# Patient Record
Sex: FEMALE | Race: AMERICAN INDIAN OR ALASKA NATIVE
[De-identification: names, ages, dates, MRNs, and addresses within clinical notes are randomized per-mention and may not be internally consistent; named-entity substitution may affect disease eponyms.]

---

## 2019-11-28 ENCOUNTER — HOSPITAL ENCOUNTER (EMERGENCY)
Dept: HOSPITAL 5 - ED | Age: 34
Discharge: HOME | End: 2019-11-28
Payer: SELF-PAY

## 2019-11-28 VITALS — SYSTOLIC BLOOD PRESSURE: 139 MMHG | DIASTOLIC BLOOD PRESSURE: 90 MMHG

## 2019-11-28 DIAGNOSIS — J40: Primary | ICD-10-CM

## 2019-11-28 DIAGNOSIS — F12.10: ICD-10-CM

## 2019-11-28 PROCEDURE — 71046 X-RAY EXAM CHEST 2 VIEWS: CPT

## 2019-11-28 PROCEDURE — 81025 URINE PREGNANCY TEST: CPT

## 2019-11-28 NOTE — EVENT NOTE
ED Screening Note


Date of service: 11/28/19


Time: 14:06


ED Screening Note: 








Patient cold symptoms with chest and congestion , cough UP brown pleghm, 

runnynose. Positive sob. No CP. No fever, chills/ NV.


Bronchitis in the past





Lungs:- NL WOB, Cough, occasional rhonchi on rt cleared withing





?Bronchtis/URI/LRTI/sinusitis





This initial assessment/diagnostic orders/clinical plan/treatment(s) is/are 

subject to change based on patients health status, clinical progression and re-

assessment by fellow clinical providers in the ED. Further treatment and workup 

at subsequent clinical providers discretion. Patient/guardian urged not to elope

from the ED as their condition may be serious if not clinically assessed and 

managed. 





Initial orders include: CXR

## 2019-11-28 NOTE — EMERGENCY DEPARTMENT REPORT
- General


Chief Complaint: Upper Respiratory Infection


Stated Complaint: COLD SX/CHEST PAIN


Time Seen by Provider: 11/28/19 14:05


Source: patient


Mode of arrival: Ambulatory


Limitations: No Limitations





- History of Present Illness


Initial Comments: 





34-year-old female presents to ED with cough 3 days.  Patient reports chest 

pain, cough productive of brownish sputum.  Reports that she is a smoker, hasn't

diagnosed with bronchitis in the past.  She reports associated mild shortness of

breath, runny nose, body aches, and chills.


MD Complaint: cough


-: days(s) (3)


Severity: moderate


Consistency: constant


Improves With: nothing


Worsens With: nothing


Associated Symptoms: chills, myalgias, rhinorrhea, nasal congestion, cough, 

chest pain, shortness of breath.  denies: nausea, vomiting





- Related Data


                                  Previous Rx's











 Medication  Instructions  Recorded  Last Taken  Type


 


Albuterol Sulfate [Proventil Hfa] 2 puff IH Q4HR PRN #1 hfa.aer.ad 11/28/19 

Unknown Rx


 


Azithromycin 500 mg PO QDAY #3 tablet 11/28/19 Unknown Rx


 


Benzonatate [Tessalon Perles] 100 mg PO Q8HR PRN #20 capsule 11/28/19 Unknown Rx


 


Naproxen [Naprosyn] 500 mg PO BID #20 tablet 11/28/19 Unknown Rx


 


predniSONE [Deltasone] 50 mg PO QDAY #5 tab 11/28/19 Unknown Rx











                                    Allergies











Allergy/AdvReac Type Severity Reaction Status Date / Time


 


No Known Allergies Allergy   Unverified 02/20/15 17:05














ED Review of Systems


ROS: 


Stated complaint: COLD SX/CHEST PAIN


Other details as noted in HPI





Comment: All other systems reviewed and negative


Constitutional: chills


Respiratory: cough, shortness of breath


Cardiovascular: chest pain


Gastrointestinal: denies: nausea, vomiting


Musculoskeletal: myalgia





ED Past Medical Hx





- Past Medical History


Previous Medical History?: No





- Surgical History


Past Surgical History?: No





- Social History


Smoking Status: Never Smoker


Substance Use Type: Marijuana





- Medications


Home Medications: 


                                Home Medications











 Medication  Instructions  Recorded  Confirmed  Last Taken  Type


 


Albuterol Sulfate [Proventil Hfa] 2 puff IH Q4HR PRN #1 hfa.aer.ad 11/28/19  

Unknown Rx


 


Azithromycin 500 mg PO QDAY #3 tablet 11/28/19  Unknown Rx


 


Benzonatate [Tessalon Perles] 100 mg PO Q8HR PRN #20 capsule 11/28/19  Unknown 

Rx


 


Naproxen [Naprosyn] 500 mg PO BID #20 tablet 11/28/19  Unknown Rx


 


predniSONE [Deltasone] 50 mg PO QDAY #5 tab 11/28/19  Unknown Rx














ED Physical Exam





- General


Limitations: No Limitations


General appearance: alert, in no apparent distress





- Head


Head exam: Present: atraumatic, normocephalic





- Eye


Eye exam: Present: normal appearance, EOMI





- ENT


ENT exam: Present: mucous membranes moist





- Neck


Neck exam: Present: normal inspection





- Respiratory


Respiratory exam: Present: normal lung sounds bilaterally.  Absent: respiratory 

distress





- Cardiovascular


Cardiovascular Exam: Present: regular rate, normal rhythm





- GI/Abdominal


GI/Abdominal exam: Absent: distended





- Extremities Exam


Extremities exam: Present: normal inspection





- Neurological Exam


Neurological exam: Present: alert, oriented X3





- Psychiatric


Psychiatric exam: Present: normal affect, normal mood





- Skin


Skin exam: Present: warm, dry, intact, normal color





ED Course


                                   Vital Signs











  11/28/19 11/28/19





  14:02 17:44


 


Temperature 98.8 F 98.9 F


 


Pulse Rate 78 79


 


Respiratory 20 16





Rate  


 


Blood Pressure 165/81 


 


Blood Pressure  139/90





[Right]  


 


O2 Sat by Pulse 100 99





Oximetry  














ED Medical Decision Making





- Radiology Data


Radiology results: report reviewed, image reviewed





- Medical Decision Making





34-year-old female presents to ED with URI symptoms.  Patient reported cough 

productive of brownish sputum.  Chest x-ray is unremarkable.  Patient is a 

smoker, reports history of bronchitis in the past.  This is likely another 

episode of bronchitis.  Patient is in no respiratory distress.  Vital signs are 

normal.  Prescriptions given.  Outpatient follow-up advised.  Return precautions

 given.





- Differential Diagnosis


URI, bronchitis, pneumonia


Critical care attestation.: 


If time is entered above; I have spent that time in minutes in the direct care 

of this critically ill patient, excluding procedure time.








ED Disposition


Clinical Impression: 


 Acute bronchitis





Disposition: DC-01 TO HOME OR SELFCARE


Is pt being admited?: No


Condition: Stable


Instructions:  Acute Bronchitis (ED)


Prescriptions: 


Azithromycin 500 mg PO QDAY #3 tablet


predniSONE [Deltasone] 50 mg PO QDAY #5 tab


Naproxen [Naprosyn] 500 mg PO BID #20 tablet


Albuterol Sulfate [Proventil Hfa] 2 puff IH Q4HR PRN #1 hfa.aer.ad


 PRN Reason: Wheezing


Benzonatate [Tessalon Perles] 100 mg PO Q8HR PRN #20 capsule


 PRN Reason: Cough


Referrals: 


PRIMARY CARE,MD [Primary Care Provider] - 3-5 Days


[facility] [Provider Group] - 3-5 Days


Forms:  Work/School Release Form(ED)


Time of Disposition: 17:13

## 2019-11-28 NOTE — XRAY REPORT
CHEST 2 VIEWS 



INDICATION / CLINICAL INFORMATION:

cough, sob, brown pleghm.



COMPARISON: 

None available.



FINDINGS:



SUPPORT DEVICES: None.



HEART / MEDIASTINUM: No significant abnormality. 



LUNGS / PLEURA: No significant pulmonary or pleural abnormality. No pneumothorax. 



ADDITIONAL FINDINGS: No significant additional findings.



IMPRESSION:

1. No acute findings.



Signer Name: Ann Iverson MD 

Signed: 11/28/2019 5:09 PM

 Workstation Name: [id number]

## 2020-01-26 ENCOUNTER — HOSPITAL ENCOUNTER (EMERGENCY)
Dept: HOSPITAL 5 - ED | Age: 35
LOS: 1 days | Discharge: HOME | End: 2020-01-27
Payer: MEDICAID

## 2020-01-26 DIAGNOSIS — E87.6: Primary | ICD-10-CM

## 2020-01-26 DIAGNOSIS — R00.2: ICD-10-CM

## 2020-01-26 DIAGNOSIS — Z79.899: ICD-10-CM

## 2020-01-26 DIAGNOSIS — F41.9: ICD-10-CM

## 2020-01-26 DIAGNOSIS — F12.10: ICD-10-CM

## 2020-01-26 DIAGNOSIS — F17.200: ICD-10-CM

## 2020-01-26 LAB
BASOPHILS # (AUTO): 0 K/MM3 (ref 0–0.1)
BASOPHILS NFR BLD AUTO: 0.5 % (ref 0–1.8)
EOSINOPHIL # BLD AUTO: 0.1 K/MM3 (ref 0–0.4)
EOSINOPHIL NFR BLD AUTO: 2.5 % (ref 0–4.3)
HCT VFR BLD CALC: 36 % (ref 30.3–42.9)
HGB BLD-MCNC: 12.2 GM/DL (ref 10.1–14.3)
LYMPHOCYTES # BLD AUTO: 1.6 K/MM3 (ref 1.2–5.4)
LYMPHOCYTES NFR BLD AUTO: 38.6 % (ref 13.4–35)
MCHC RBC AUTO-ENTMCNC: 34 % (ref 30–34)
MCV RBC AUTO: 98 FL (ref 79–97)
MONOCYTES # (AUTO): 0.4 K/MM3 (ref 0–0.8)
MONOCYTES % (AUTO): 9.8 % (ref 0–7.3)
PLATELET # BLD: 176 K/MM3 (ref 140–440)
RBC # BLD AUTO: 3.68 M/MM3 (ref 3.65–5.03)

## 2020-01-26 PROCEDURE — 71275 CT ANGIOGRAPHY CHEST: CPT

## 2020-01-26 PROCEDURE — 85025 COMPLETE CBC W/AUTO DIFF WBC: CPT

## 2020-01-26 PROCEDURE — 93010 ELECTROCARDIOGRAM REPORT: CPT

## 2020-01-26 PROCEDURE — 84703 CHORIONIC GONADOTROPIN ASSAY: CPT

## 2020-01-26 PROCEDURE — 84439 ASSAY OF FREE THYROXINE: CPT

## 2020-01-26 PROCEDURE — 93005 ELECTROCARDIOGRAM TRACING: CPT

## 2020-01-26 PROCEDURE — 84484 ASSAY OF TROPONIN QUANT: CPT

## 2020-01-26 PROCEDURE — 80048 BASIC METABOLIC PNL TOTAL CA: CPT

## 2020-01-26 PROCEDURE — 36415 COLL VENOUS BLD VENIPUNCTURE: CPT

## 2020-01-26 PROCEDURE — 83735 ASSAY OF MAGNESIUM: CPT

## 2020-01-26 PROCEDURE — 85379 FIBRIN DEGRADATION QUANT: CPT

## 2020-01-26 PROCEDURE — 84443 ASSAY THYROID STIM HORMONE: CPT

## 2020-01-26 PROCEDURE — 80307 DRUG TEST PRSMV CHEM ANLYZR: CPT

## 2020-01-26 PROCEDURE — 99285 EMERGENCY DEPT VISIT HI MDM: CPT

## 2020-01-26 PROCEDURE — 71045 X-RAY EXAM CHEST 1 VIEW: CPT

## 2020-01-27 VITALS — SYSTOLIC BLOOD PRESSURE: 146 MMHG | DIASTOLIC BLOOD PRESSURE: 81 MMHG

## 2020-01-27 LAB
BENZODIAZEPINES SCREEN,URINE: (no result)
BUN SERPL-MCNC: 11 MG/DL (ref 7–17)
BUN/CREAT SERPL: 16 %
CALCIUM SERPL-MCNC: 9.3 MG/DL (ref 8.4–10.2)
HEMOLYSIS INDEX: 0
METHADONE SCREEN,URINE: (no result)
OPIATE SCREEN,URINE: (no result)
T4 FREE SERPL-MCNC: 1.17 NG/DL (ref 0.76–1.46)

## 2020-01-27 NOTE — EMERGENCY DEPARTMENT REPORT
ED Palpitations HPI





- General


Chief Complaint: Arrhythmia/Palpitations


Stated Complaint: RAPID HR


Time Seen by Provider: 01/27/20 03:13


Source: patient, EMS, old records reviewed (pt here 12/28/2019 for chest pain 

and left ankle swelling )


Mode of arrival: Ambulatory


Limitations: No Limitations





- History of Present Illness


Initial Comments: 





34-year-old female with no known past medical history presents complaining of 

intermittent palpitation and chest heaviness for last 2 days.  Yesterday patient

had an episode while exercising.  Her heart rate increased, chest pressure, and 

felt shaky.  Symptoms resolved with rest.  Today she had another episode while 

sitting relaxed.  She described it as a chest pressure that starts in the 

epigastrium and radiates to chest, feeling shaky, and palpitations.  She denies 

any skin shortness of breath, nausea, vomiting, or diaphoresis.  EMS reports 

that her heart rate ranged from 106-108 and went up to 140 for about 20 seconds 

then settled back down to 106.  Patient admits to marijuana use but denies other

drug ingestion.  She uses alcohol occasionally.  Patient is not currently on 

birth control pills, denies history of PE /dvt, clf tenderness recent travel.  

Patient has had left medial ankle swelling with mild intermittent pain for the 

last several weeks but denies calf tenderness or swelling.  Patient does endorse

some feeling of anxiety during episodes and in the past without a definitive 

diagnosis by a doctor.  PMD [facility]





- Related Data


                                  Previous Rx's











 Medication  Instructions  Recorded  Last Taken  Type


 


Albuterol Sulfate [Proventil Hfa] 2 puff IH Q4HR PRN #1 hfa.aer.ad 11/28/19 

Unknown Rx


 


Azithromycin 500 mg PO QDAY #3 tablet 11/28/19 Unknown Rx


 


Benzonatate [Tessalon Perles] 100 mg PO Q8HR PRN #20 capsule 11/28/19 Unknown Rx


 


Naproxen [Naprosyn] 500 mg PO BID #20 tablet 11/28/19 Unknown Rx


 


predniSONE [Deltasone] 50 mg PO QDAY #5 tab 11/28/19 Unknown Rx


 


Naproxen [Naprosyn] 500 mg PO BID #20 tablet 12/28/19 Unknown Rx


 


Ondansetron [Zofran Odt] 4 mg PO Q8HR PRN #20 tab.rapdis 12/28/19 Unknown Rx


 


traMADoL [Ultram] 50 mg PO Q6HR PRN #7 tablet 12/28/19 Unknown Rx


 


hydrOXYzine PAMOATE [Vistaril] 50 mg PO Q6HR PRN #20 capsule 01/27/20 Unknown Rx











                                    Allergies











Allergy/AdvReac Type Severity Reaction Status Date / Time


 


No Known Allergies Allergy   Unverified 02/20/15 17:05














ED Review of Systems


ROS: 


Stated complaint: RAPID HR


Other details as noted in HPI





Comment: All other systems reviewed and negative





ED Past Medical Hx





- Past Medical History


Previous Medical History?: No





- Surgical History


Past Surgical History?: No





- Social History


Smoking Status: Current Every Day Smoker


Substance Use Type: Marijuana





- Medications


Home Medications: 


                                Home Medications











 Medication  Instructions  Recorded  Confirmed  Last Taken  Type


 


Albuterol Sulfate [Proventil Hfa] 2 puff IH Q4HR PRN #1 hfa.aer.ad 11/28/19  

Unknown Rx


 


Azithromycin 500 mg PO QDAY #3 tablet 11/28/19  Unknown Rx


 


Benzonatate [Tessalon Perles] 100 mg PO Q8HR PRN #20 capsule 11/28/19  Unknown 

Rx


 


Naproxen [Naprosyn] 500 mg PO BID #20 tablet 11/28/19  Unknown Rx


 


predniSONE [Deltasone] 50 mg PO QDAY #5 tab 11/28/19  Unknown Rx


 


Naproxen [Naprosyn] 500 mg PO BID #20 tablet 12/28/19  Unknown Rx


 


Ondansetron [Zofran Odt] 4 mg PO Q8HR PRN #20 tab.rapdis 12/28/19  Unknown Rx


 


traMADoL [Ultram] 50 mg PO Q6HR PRN #7 tablet 12/28/19  Unknown Rx


 


hydrOXYzine PAMOATE [Vistaril] 50 mg PO Q6HR PRN #20 capsule 01/27/20  Unknown 

Rx














ED Physical Exam





- General


Limitations: No Limitations





- Other


Other exam information: 








General: No limitations, patient is alert in no acute distress


Head exam: Atraumatic, normocephalic


Eyes exam: Normal appearance


ENT: Moist mucous membrane


Neck exam: Normal inspection, full range of motion


Respiratory exam: Clear to auscultation bilateral, no wheezes, rales, crackles


Cardiovascular: Normal rate and rhythm


Abdomen: Soft, nondistended, and  nontender, with normal bowel sounds, no 

rebound, or guarding, 


Extremity: No deformity.  Full range of motion of extremities including left 

ankle.  Mild medial left ankle swelling without tenderness, warmth, or erythema.

  No calf tenderness or calf edema.


Back: Normal Inspection


Neurologic: Alert, oriented x3, speech clear, no gross motor or sensory deficit


Psychiatric: Normal mood, affect 


Skin: No rash





ED Course


                                   Vital Signs











  01/26/20 01/27/20 01/27/20





  23:31 03:47 04:00


 


Temperature 99.3 F  


 


Pulse Rate 85  88


 


Respiratory 18  23





Rate   


 


Blood Pressure 152/94  146/81


 


O2 Sat by Pulse 100 87 100





Oximetry   














  01/27/20 01/27/20





  04:16 04:30


 


Temperature  


 


Pulse Rate 76 


 


Respiratory 22 14





Rate  


 


Blood Pressure 146/81 146/81


 


O2 Sat by Pulse 100 





Oximetry  














ED Medical Decision Making





- Lab Data


Result diagrams: 


                                 01/26/20 23:40





                                 01/26/20 23:40








                                   Lab Results











  01/26/20 01/26/20 01/27/20 Range/Units





  23:40 23:40 02:30 


 


WBC  4.1 L    (4.5-11.0)  K/mm3


 


RBC  3.68    (3.65-5.03)  M/mm3


 


Hgb  12.2    (10.1-14.3)  gm/dl


 


Hct  36.0    (30.3-42.9)  %


 


MCV  98 H    (79-97)  fl


 


MCH  33 H    (28-32)  pg


 


MCHC  34    (30-34)  %


 


RDW  13.8    (13.2-15.2)  %


 


Plt Count  176    (140-440)  K/mm3


 


Lymph % (Auto)  38.6 H    (13.4-35.0)  %


 


Mono % (Auto)  9.8 H    (0.0-7.3)  %


 


Eos % (Auto)  2.5    (0.0-4.3)  %


 


Baso % (Auto)  0.5    (0.0-1.8)  %


 


Lymph #  1.6    (1.2-5.4)  K/mm3


 


Mono #  0.4    (0.0-0.8)  K/mm3


 


Eos #  0.1    (0.0-0.4)  K/mm3


 


Baso #  0.0    (0.0-0.1)  K/mm3


 


Seg Neutrophils %  48.6    (40.0-70.0)  %


 


Seg Neutrophils #  2.0    (1.8-7.7)  K/mm3


 


D-Dimer     (0-234)  ng/mlDDU


 


Sodium   140   (137-145)  mmol/L


 


Potassium   3.4 L   (3.6-5.0)  mmol/L


 


Chloride   102.5   ()  mmol/L


 


Carbon Dioxide   22   (22-30)  mmol/L


 


Anion Gap   19   mmol/L


 


BUN   11   (7-17)  mg/dL


 


Creatinine   0.7   (0.7-1.2)  mg/dL


 


Estimated GFR   > 60   ml/min


 


BUN/Creatinine Ratio   16   %


 


Glucose   138 H   ()  mg/dL


 


Calcium   9.3   (8.4-10.2)  mg/dL


 


Magnesium     (1.7-2.3)  mg/dL


 


Troponin T   < 0.010  < 0.010  (0.00-0.029)  ng/mL


 


TSH     (0.270-4.200)  mlU/mL


 


Free T4     (0.76-1.46)  ng/dL


 


HCG, Qual     (Negative)  


 


Urine Opiates Screen     


 


Urine Methadone Screen     


 


Ur Barbiturates Screen     


 


Ur Phencyclidine Scrn     


 


Ur Amphetamines Screen     


 


U Benzodiazepines Scrn     


 


Urine Cocaine Screen     


 


U Marijuana (THC) Screen     


 


Drugs of Abuse Note     














  01/27/20 01/27/20 01/27/20 Range/Units





  03:29 03:29 03:29 


 


WBC     (4.5-11.0)  K/mm3


 


RBC     (3.65-5.03)  M/mm3


 


Hgb     (10.1-14.3)  gm/dl


 


Hct     (30.3-42.9)  %


 


MCV     (79-97)  fl


 


MCH     (28-32)  pg


 


MCHC     (30-34)  %


 


RDW     (13.2-15.2)  %


 


Plt Count     (140-440)  K/mm3


 


Lymph % (Auto)     (13.4-35.0)  %


 


Mono % (Auto)     (0.0-7.3)  %


 


Eos % (Auto)     (0.0-4.3)  %


 


Baso % (Auto)     (0.0-1.8)  %


 


Lymph #     (1.2-5.4)  K/mm3


 


Mono #     (0.0-0.8)  K/mm3


 


Eos #     (0.0-0.4)  K/mm3


 


Baso #     (0.0-0.1)  K/mm3


 


Seg Neutrophils %     (40.0-70.0)  %


 


Seg Neutrophils #     (1.8-7.7)  K/mm3


 


D-Dimer  287.38 H    (0-234)  ng/mlDDU


 


Sodium     (137-145)  mmol/L


 


Potassium     (3.6-5.0)  mmol/L


 


Chloride     ()  mmol/L


 


Carbon Dioxide     (22-30)  mmol/L


 


Anion Gap     mmol/L


 


BUN     (7-17)  mg/dL


 


Creatinine     (0.7-1.2)  mg/dL


 


Estimated GFR     ml/min


 


BUN/Creatinine Ratio     %


 


Glucose     ()  mg/dL


 


Calcium     (8.4-10.2)  mg/dL


 


Magnesium     (1.7-2.3)  mg/dL


 


Troponin T     (0.00-0.029)  ng/mL


 


TSH    1.600  (0.270-4.200)  mlU/mL


 


Free T4    1.17  (0.76-1.46)  ng/dL


 


HCG, Qual   Negative   (Negative)  


 


Urine Opiates Screen     


 


Urine Methadone Screen     


 


Ur Barbiturates Screen     


 


Ur Phencyclidine Scrn     


 


Ur Amphetamines Screen     


 


U Benzodiazepines Scrn     


 


Urine Cocaine Screen     


 


U Marijuana (THC) Screen     


 


Drugs of Abuse Note     














  01/27/20 01/27/20 Range/Units





  03:29 04:02 


 


WBC    (4.5-11.0)  K/mm3


 


RBC    (3.65-5.03)  M/mm3


 


Hgb    (10.1-14.3)  gm/dl


 


Hct    (30.3-42.9)  %


 


MCV    (79-97)  fl


 


MCH    (28-32)  pg


 


MCHC    (30-34)  %


 


RDW    (13.2-15.2)  %


 


Plt Count    (140-440)  K/mm3


 


Lymph % (Auto)    (13.4-35.0)  %


 


Mono % (Auto)    (0.0-7.3)  %


 


Eos % (Auto)    (0.0-4.3)  %


 


Baso % (Auto)    (0.0-1.8)  %


 


Lymph #    (1.2-5.4)  K/mm3


 


Mono #    (0.0-0.8)  K/mm3


 


Eos #    (0.0-0.4)  K/mm3


 


Baso #    (0.0-0.1)  K/mm3


 


Seg Neutrophils %    (40.0-70.0)  %


 


Seg Neutrophils #    (1.8-7.7)  K/mm3


 


D-Dimer    (0-234)  ng/mlDDU


 


Sodium    (137-145)  mmol/L


 


Potassium    (3.6-5.0)  mmol/L


 


Chloride    ()  mmol/L


 


Carbon Dioxide    (22-30)  mmol/L


 


Anion Gap    mmol/L


 


BUN    (7-17)  mg/dL


 


Creatinine    (0.7-1.2)  mg/dL


 


Estimated GFR    ml/min


 


BUN/Creatinine Ratio    %


 


Glucose    ()  mg/dL


 


Calcium    (8.4-10.2)  mg/dL


 


Magnesium  2.00   (1.7-2.3)  mg/dL


 


Troponin T    (0.00-0.029)  ng/mL


 


TSH    (0.270-4.200)  mlU/mL


 


Free T4    (0.76-1.46)  ng/dL


 


HCG, Qual    (Negative)  


 


Urine Opiates Screen   Presumptive negative  


 


Urine Methadone Screen   Presumptive negative  


 


Ur Barbiturates Screen   Presumptive negative  


 


Ur Phencyclidine Scrn   Presumptive negative  


 


Ur Amphetamines Screen   Presumptive negative  


 


U Benzodiazepines Scrn   Presumptive negative  


 


Urine Cocaine Screen   Presumptive negative  


 


U Marijuana (THC) Screen   Presumptive positive  


 


Drugs of Abuse Note   Disclamer  














- EKG Data


-: EKG Interpreted by Me


EKG shows normal: sinus rhythm, ST-T waves (no STEMI)


Rate: tachycardia (101)





- EKG Data


When compared to previous EKG there are: previous EKG unavailable





- Radiology Data


Radiology results: report reviewed





CTA of the chest with 3D Reconstruction











Indication:


,cp, palpitations, elevated ddimer





Technique:


TECHNIQUE: Axial CT images were obtained through the chest after injection of 

100 cc of Omnipaque


350 IV contrast. 3 plane MIP reconstructions were produced. All CT scans at this

 location are


performed using CT dose reduction for ALARA by means of automated exposure 

control.





COMPARISON:


None











Automatic exposure control was utilized in an attempt to reduce radiation dose.





Findings:





Pulmonary arteries: The main pulmonary artery and right and left pulmonary 

artery branches fill


satisfactorily with contrast. No pulmonary embolus is seen.


Lungs: The lungs are clear.


Mediastinum: Heart size is normal. No adenopathy is seen.


Aorta: Normal in diameter. No dissection seen within limits of this exam.














Impression:


No pulmonary embolus is seen








- Medical Decision Making





mild tachy on arrival resolved


trop neg x 2 with low heart score


mild ddimer elevation with neg cta


mild hypokalemia tx with PO kcl


no recurrent palp during ed stay


uds + thc


ddx anxiety vs arrhythmia


outpt f/u with pmd and card for possible holter monitor will be advised.


pt requesting meds to help with her anxiety sx. vistaril will be prescribed (pt 

states she has been prescribed it in the past





- Differential Diagnosis


pe, anxiey, atypical cp, mi, pneumonthorax, arrhythmia, thyroid disease


Critical Care Time: No


Critical care attestation.: 


If time is entered above; I have spent that time in minutes in the direct care o

f this critically ill patient, excluding procedure time.








ED Disposition


Clinical Impression: 


 Palpitations, Hypokalemia, Anxiety





Disposition: DC-01 TO HOME OR SELFCARE


Is pt being admited?: No


Does the pt Need Aspirin: No


Condition: Stable


Instructions:  Palpitations (ED), Anxiety (ED)


Additional Instructions: 


  Follow-up with your doctor  or with the doctor/clinic provided.  Return if 

symptoms worsen as indicated by your discharge instructions.


Prescriptions: 


hydrOXYzine PAMOATE [Vistaril] 50 mg PO Q6HR PRN #20 capsule


 PRN Reason: Anxiety


Referrals: 


[facility] [Primary [facility] Provider[facility] - 3-5 Days


HOWARD GRAY MD [Staff Physician] - 2-3 Days (cardiolgy)


Time of Disposition: 05:30

## 2020-01-27 NOTE — CAT SCAN REPORT
CTA of the chest with 3D Reconstruction







Indication:

,cp, palpitations, elevated ddimer



Technique: 

TECHNIQUE: Axial CT images were obtained through the chest after injection of 100 cc of Omnipaque 350
 IV contrast. 3 plane MIP reconstructions were produced. All CT scans at this location are performed 
using CT dose reduction for ALARA by means of automated exposure control.



COMPARISON:

None







 Automatic exposure control was utilized in an attempt to reduce radiation dose.



Findings:



Pulmonary arteries: The main pulmonary artery and right and left pulmonary artery branches fill satis
factorily with contrast. No pulmonary embolus is seen.

Lungs: The lungs are clear.

Mediastinum: Heart size is normal.  No adenopathy is seen.

Aorta: Normal in diameter.  No dissection seen within limits of this exam.









Impression:

No pulmonary embolus is seen



Signer Name: Shashi Araya MD 

Signed: 1/27/2020 5:17 AM

 Workstation Name: [id number]

## 2020-01-27 NOTE — XRAY REPORT
CHEST 1 VIEW 



INDICATION / CLINICAL INFORMATION:

Chest Pain.



COMPARISON: 

12/28/2019



FINDINGS:



SUPPORT DEVICES: None.

HEART / MEDIASTINUM: No significant abnormality. 

LUNGS / PLEURA: No significant pulmonary or pleural abnormality..  No pneumothorax. 



ADDITIONAL FINDINGS: No significant additional findings.



IMPRESSION:

1. No acute findings.



Signer Name: Shashi Araya MD 

Signed: 1/26/2020 11:59 PM

 Workstation Name: [id number]

## 2020-01-30 ENCOUNTER — HOSPITAL ENCOUNTER (EMERGENCY)
Dept: HOSPITAL 5 - ED | Age: 35
Discharge: LEFT BEFORE DISCHARGE | End: 2020-01-30
Payer: MEDICAID

## 2020-01-30 VITALS — SYSTOLIC BLOOD PRESSURE: 140 MMHG | DIASTOLIC BLOOD PRESSURE: 95 MMHG

## 2020-01-30 DIAGNOSIS — F41.9: Primary | ICD-10-CM

## 2020-01-30 PROCEDURE — 93010 ELECTROCARDIOGRAM REPORT: CPT

## 2020-01-30 PROCEDURE — 93005 ELECTROCARDIOGRAM TRACING: CPT

## 2020-01-30 NOTE — EMERGENCY DEPARTMENT REPORT
Chief Complaint: Weakness


Stated Complaint: HEART MONITOR/DEHYDRATED/FLU





- HPI


History of Present Illness: 





patient presents with recurrent anxiety like symptoms








had extensive work up 4 days ago








followed by [facility]


has holter monitor








no hi no si





sober


gcs 15





s1 s2 rrr, mild tachycardia





heart rate 105-110 on my exam


likely anxiety driven given recent extensive negative work up


clear lungs


steady gait








gcs 15








extensive counseling done; self calming techniques


outpatient resources


complimentary therapy





neg trop x 3





cta chest neg





there is no facial droop.  Tongue is midline.  Extraocular movements are intact 

bilaterally.  Walking with a steady gait.  Speaking in full sentences.  Normal 

appropriate thought content.  5 out of 5 strength in 4 extremities.  Sensation 

is intact to light touch in 4 extremities.





2+ pulses noted in the bilateral upper and lower extremities.  There is no long 

bony tenderness.  The pelvis is stable.  The muscular compartments are soft.  

There is no palpable cord.  There is no redness, pus or streaking.





Tachycardia resolved on my examination.  States she is ready for discharge.  

Endorses ruddiness to follow up as an outpatient.  Not homicidal or suicidal.


                                   Vital Signs











  01/30/20





  13:00


 


Temperature 98 F


 


Pulse Rate 130 H


 


Respiratory 20





Rate 


 


Blood Pressure 140/95





[Right] 


 


O2 Sat by Pulse 98





Oximetry 























                                   Vital Signs











  01/30/20





  13:00


 


Temperature 98 F


 


Pulse Rate 130 H


 


Respiratory 20





Rate 


 


Blood Pressure 140/95





[Right] 


 


O2 Sat by Pulse 98





Oximetry 


























MSE screening note: 


Focused history and physical exam performed.


Due to findings the following was ordered:














ekgL: sinus 69 bpm, normal exias, qtc 383 ms, not a stemi, normal intervals, 

rate 69 bpm





ED Disposition for MSE


Clinical Impression: 


 Palpitations, Anxiety





Disposition: Z-07 MED SCREENING EXAM-LEFT


Is pt being admited?: No


Does the pt Need Aspirin: No


Condition: Stable

## 2020-02-02 ENCOUNTER — HOSPITAL ENCOUNTER (EMERGENCY)
Dept: HOSPITAL 5 - ED | Age: 35
LOS: 1 days | Discharge: HOME | End: 2020-02-03
Payer: MEDICAID

## 2020-02-02 VITALS — DIASTOLIC BLOOD PRESSURE: 74 MMHG | SYSTOLIC BLOOD PRESSURE: 118 MMHG

## 2020-02-02 DIAGNOSIS — Z79.899: ICD-10-CM

## 2020-02-02 DIAGNOSIS — F41.9: ICD-10-CM

## 2020-02-02 DIAGNOSIS — F17.200: ICD-10-CM

## 2020-02-02 DIAGNOSIS — K21.9: Primary | ICD-10-CM

## 2020-02-02 LAB
BASOPHILS # (AUTO): 0 K/MM3 (ref 0–0.1)
BASOPHILS NFR BLD AUTO: 0.6 % (ref 0–1.8)
BUN SERPL-MCNC: 9 MG/DL (ref 7–17)
BUN/CREAT SERPL: 13 %
CALCIUM SERPL-MCNC: 10.5 MG/DL (ref 8.4–10.2)
EOSINOPHIL # BLD AUTO: 0.1 K/MM3 (ref 0–0.4)
EOSINOPHIL NFR BLD AUTO: 1.4 % (ref 0–4.3)
HCT VFR BLD CALC: 41.7 % (ref 30.3–42.9)
HEMOLYSIS INDEX: 4
HGB BLD-MCNC: 14.2 GM/DL (ref 10.1–14.3)
LYMPHOCYTES # BLD AUTO: 1.4 K/MM3 (ref 1.2–5.4)
LYMPHOCYTES NFR BLD AUTO: 30.3 % (ref 13.4–35)
MCHC RBC AUTO-ENTMCNC: 34 % (ref 30–34)
MCV RBC AUTO: 97 FL (ref 79–97)
MONOCYTES # (AUTO): 0.4 K/MM3 (ref 0–0.8)
MONOCYTES % (AUTO): 8.6 % (ref 0–7.3)
PLATELET # BLD: 252 K/MM3 (ref 140–440)
RBC # BLD AUTO: 4.28 M/MM3 (ref 3.65–5.03)

## 2020-02-02 PROCEDURE — 85025 COMPLETE CBC W/AUTO DIFF WBC: CPT

## 2020-02-02 PROCEDURE — 84703 CHORIONIC GONADOTROPIN ASSAY: CPT

## 2020-02-02 PROCEDURE — 93005 ELECTROCARDIOGRAM TRACING: CPT

## 2020-02-02 PROCEDURE — 80048 BASIC METABOLIC PNL TOTAL CA: CPT

## 2020-02-02 PROCEDURE — 84484 ASSAY OF TROPONIN QUANT: CPT

## 2020-02-02 PROCEDURE — 71045 X-RAY EXAM CHEST 1 VIEW: CPT

## 2020-02-02 PROCEDURE — 36415 COLL VENOUS BLD VENIPUNCTURE: CPT

## 2020-02-02 PROCEDURE — 93010 ELECTROCARDIOGRAM REPORT: CPT

## 2020-02-03 NOTE — EMERGENCY DEPARTMENT REPORT
ED Chest Pain HPI





- General


Chief Complaint: Chest Pain


Stated Complaint: CHEST PAIN/ACID REFLUX/DIZZINESS


Time Seen by Provider: 02/03/20 00:21


Source: patient


Mode of arrival: Ambulatory


Limitations: No Limitations





- History of Present Illness


Initial Comments: 





Aniya is a 35 yo female with hx of palpitations, GERD, anxiety who presents 

with "acid reflux" for the past 2 days.  She has throat burning, heart burn, 

epigastirc pressure with poor appetite.  Symptoms worse with eating.  Discomfort

is worse with laying on either side.  Recently prescribed metoprolol by [facility]ologist this week.  has been prescribed Xanax.  Followed by Dr. Siu

PCP [facility]


MD Complaint: chest pain


-: Gradual, days(s) (2)


Onset: during rest


Pain Location: substernal, left chest


Severity scale (0 -10): 10


Quality: other (burning with throat irritation)


Consistency: constant


Worsens With: eating, supine


Context: recent illness (recently evaluated with Holter monitor)


re: nausea





- Related Data


                                  Previous Rx's











 Medication  Instructions  Recorded  Last Taken  Type


 


Albuterol Sulfate [Proventil Hfa] 2 puff IH Q4HR PRN #1 hfa.aer.ad 11/28/19 Unkn

own Rx


 


Azithromycin 500 mg PO QDAY #3 tablet 11/28/19 Unknown Rx


 


Benzonatate [Tessalon Perles] 100 mg PO Q8HR PRN #20 capsule 11/28/19 Unknown Rx


 


Naproxen [Naprosyn] 500 mg PO BID #20 tablet 11/28/19 Unknown Rx


 


predniSONE [Deltasone] 50 mg PO QDAY #5 tab 11/28/19 Unknown Rx


 


Naproxen [Naprosyn] 500 mg PO BID #20 tablet 12/28/19 Unknown Rx


 


Ondansetron [Zofran Odt] 4 mg PO Q8HR PRN #20 tab.rapdis 12/28/19 Unknown Rx


 


traMADoL [Ultram] 50 mg PO Q6HR PRN #7 tablet 12/28/19 Unknown Rx


 


hydrOXYzine PAMOATE [Vistaril] 50 mg PO Q6HR PRN #20 capsule 01/27/20 Unknown Rx


 


Famotidine [Pepcid] 20 mg PO BID 30 Days #60 tablet 02/03/20 Unknown Rx


 


Promethazine [Phenergan] 25 mg PO Q6HR PRN #10 tab 02/03/20 Unknown Rx











                                    Allergies











Allergy/AdvReac Type Severity Reaction Status Date / Time


 


No Known Allergies Allergy   Verified 01/30/20 12:53














Heart Score





- HEART Score


History: Slightly suspicious


EKG: Normal


Age: < 45


Risk factors: No known risk factors


Troponin: < normal limit


HEART Score: 0





ED Review of Systems


ROS: 


Stated complaint: CHEST PAIN/ACID REFLUX/DIZZINESS


Other details as noted in HPI





Comment: All other systems reviewed and negative


Respiratory: denies: cough, shortness of breath


Cardiovascular: chest pain


Gastrointestinal: nausea





ED Past Medical Hx





- Past Medical History


Previous Medical History?: Yes


Hx Psychiatric Treatment: Yes (Anxiety)


Additional medical history: RACING HEART





- Surgical History


Past Surgical History?: No





- Social History


Smoking Status: Current Every Day Smoker


Substance Use Type: None





- Medications


Home Medications: 


                                Home Medications











 Medication  Instructions  Recorded  Confirmed  Last Taken  Type


 


Albuterol Sulfate [Proventil Hfa] 2 puff IH Q4HR PRN #1 hfa.aer.ad 11/28/19  

Unknown Rx


 


Azithromycin 500 mg PO QDAY #3 tablet 11/28/19  Unknown Rx


 


Benzonatate [Tessalon Perles] 100 mg PO Q8HR PRN #20 capsule 11/28/19  Unknown 

Rx


 


Naproxen [Naprosyn] 500 mg PO BID #20 tablet 11/28/19  Unknown Rx


 


predniSONE [Deltasone] 50 mg PO QDAY #5 tab 11/28/19  Unknown Rx


 


Naproxen [Naprosyn] 500 mg PO BID #20 tablet 12/28/19  Unknown Rx


 


Ondansetron [Zofran Odt] 4 mg PO Q8HR PRN #20 tab.rapdis 12/28/19  Unknown Rx


 


traMADoL [Ultram] 50 mg PO Q6HR PRN #7 tablet 12/28/19  Unknown Rx


 


hydrOXYzine PAMOATE [Vistaril] 50 mg PO Q6HR PRN #20 capsule 01/27/20  Unknown 

Rx


 


Famotidine [Pepcid] 20 mg PO BID 30 Days #60 tablet 02/03/20  Unknown Rx


 


Promethazine [Phenergan] 25 mg PO Q6HR PRN #10 tab 02/03/20  Unknown Rx














ED Physical Exam





- General


Limitations: No Limitations


General appearance: alert, in no apparent distress





- Head


Head exam: Present: atraumatic, normocephalic





- Eye


Eye exam: Present: normal appearance





- ENT


ENT exam: Present: mucous membranes moist





- Neck


Neck exam: Present: normal inspection, full ROM





- Respiratory


Respiratory exam: Present: normal lung sounds bilaterally.  Absent: respiratory 

distress, wheezes, rales, rhonchi





- Cardiovascular


Cardiovascular Exam: Present: regular rate, normal rhythm, normal heart sounds. 

 Absent: systolic murmur, diastolic murmur, rubs, gallop





- GI/Abdominal


GI/Abdominal exam: Present: soft, normal bowel sounds.  Absent: distended, 

tenderness, guarding, rebound





- Extremities Exam


Extremities exam: Present: normal inspection





- Neurological Exam


Neurological exam: Present: alert, oriented X3





- Psychiatric


Psychiatric exam: Present: normal affect, normal mood





- Skin


Skin exam: Present: warm, dry, intact, normal color.  Absent: rash





ED Course





                                   Vital Signs











  02/02/20





  22:14


 


Temperature 98.6 F


 


Pulse Rate 60


 


Respiratory 20





Rate 


 


Blood Pressure 118/74





[Left] 


 


O2 Sat by Pulse 99





Oximetry 














ED Medical Decision Making





- Lab Data


Result diagrams: 


                                 02/02/20 22:40





                                 02/02/20 22:40








                         Laboratory Results - last 24 hr











  02/02/20 02/02/20 02/02/20





  22:40 22:40 22:40


 


WBC   4.8 


 


RBC   4.28 


 


Hgb   14.2 


 


Hct   41.7 


 


MCV   97 


 


MCH   33 H 


 


MCHC   34 


 


RDW   13.6 


 


Plt Count   252 


 


Lymph % (Auto)   30.3 


 


Mono % (Auto)   8.6 H 


 


Eos % (Auto)   1.4 


 


Baso % (Auto)   0.6 


 


Lymph #   1.4 


 


Mono #   0.4 


 


Eos #   0.1 


 


Baso #   0.0 


 


Seg Neutrophils %   59.1 


 


Seg Neutrophils #   2.8 


 


Sodium    139


 


Potassium    4.1


 


Chloride    99.7


 


Carbon Dioxide    24


 


Anion Gap    19


 


BUN    9


 


Creatinine    0.7


 


Estimated GFR    > 60


 


BUN/Creatinine Ratio    13


 


Glucose    98


 


Calcium    10.5 H


 


Troponin T    < 0.010


 


HCG, Qual  Negative  














- EKG Data


EKG shows normal: sinus rhythm, axis, intervals, QRS complexes, ST-T waves


Rate: normal





- EKG Data


Interpretation: normal EKG





- Radiology Data


Radiology results: report reviewed





Chest radiograph: No acute findings





- Medical Decision Making





Ms. Pulliam presents with symptoms corresponding to GERD.  I suspect that


Medications likely have aggravated known diagnosis.  I do not suspect pulmonary 

embolism.  PERC negative.





Heart rate 60.  No indication of pericarditis ACS pneumonia on evaluation today.





Prescribed famotidine and promethazine referred to her PCP Dr. Siu


Critical care attestation.: 


If time is entered above; I have spent that time in minutes in the direct care 

of this critically ill patient, excluding procedure time.








ED Disposition


Clinical Impression: 


 GERD (gastroesophageal reflux disease)





Disposition: DC-01 TO HOME OR SELFCARE


Is pt being admited?: No


Does the pt Need Aspirin: No


Condition: Stable


Instructions:  Gastroesophageal Reflux Disease (ED)


Prescriptions: 


Famotidine [Pepcid] 20 mg PO BID 30 Days #60 tablet


Promethazine [Phenergan] 25 mg PO Q6HR PRN #10 tab


 PRN Reason: Nausea


Referrals: 


FLY SIU MD [Primary Care Provider] - 3-5 Days


Forms:  Work/School Release Form(ED)

## 2020-02-03 NOTE — XRAY REPORT
CHEST 1 VIEW 



INDICATION: 

Chest Pain.



COMPARISON: 

1/26/2020



FINDINGS:

Support devices: None.



Heart: Within normal limits. 

Lungs/Pleura: No acute air space or interstitial disease. 



Additional findings: None.



IMPRESSION:

1. No acute findings.



Signer Name: Bal Mir MD 

Signed: 2/3/2020 12:11 AM

 Workstation Name: [employer]

## 2020-02-05 ENCOUNTER — HOSPITAL ENCOUNTER (EMERGENCY)
Dept: HOSPITAL 5 - ED | Age: 35
Discharge: LEFT BEFORE DISCHARGE | End: 2020-02-05
Payer: MEDICAID

## 2020-02-05 DIAGNOSIS — R51: ICD-10-CM

## 2020-02-05 DIAGNOSIS — Z53.21: ICD-10-CM

## 2020-02-05 DIAGNOSIS — R09.81: Primary | ICD-10-CM

## 2020-03-16 ENCOUNTER — HOSPITAL ENCOUNTER (EMERGENCY)
Dept: HOSPITAL 5 - ED | Age: 35
Discharge: HOME | End: 2020-03-16
Payer: MEDICAID

## 2020-03-16 VITALS — DIASTOLIC BLOOD PRESSURE: 69 MMHG | SYSTOLIC BLOOD PRESSURE: 112 MMHG

## 2020-03-16 DIAGNOSIS — Z79.899: ICD-10-CM

## 2020-03-16 DIAGNOSIS — Z88.8: ICD-10-CM

## 2020-03-16 DIAGNOSIS — Z87.891: ICD-10-CM

## 2020-03-16 DIAGNOSIS — F41.9: ICD-10-CM

## 2020-03-16 DIAGNOSIS — K04.7: Primary | ICD-10-CM

## 2020-03-16 PROCEDURE — 99282 EMERGENCY DEPT VISIT SF MDM: CPT

## 2020-03-16 NOTE — EMERGENCY DEPARTMENT REPORT
ED ENT HPI





- General


Chief complaint: Dental/Oral


Stated complaint: HARD TO BREATH


Time Seen by Provider: 20 14:29


Source: patient


Mode of arrival: Ambulatory


Limitations: No Limitations





- History of Present Illness


Initial comments: 





35-year-old -American female presents to the emergency room for an 

abscess to the left side of her gum for the last couple nights.  Patient states 

that ibuprofen 400 mg is not helping with her pain.  Patient also reports a 

scratchy throat.  Patient denies any fever no nausea no vomiting no cough.


MD complaint: tooth pain


Onset/Timin


-: days(s)


Location: tooth # (37)


Severity scale (0 -10): 10


Quality: sharp


Consistency: constant


Improves with: none


Worsens with: none


Associated Symptoms: gum swelling, toothache





- Related Data


                                  Previous Rx's











 Medication  Instructions  Recorded  Last Taken  Type


 


Albuterol Sulfate [Proventil Hfa] 2 puff IH Q4HR PRN #1 hfa.aer.ad 19 

Unknown Rx


 


Azithromycin 500 mg PO QDAY #3 tablet 19 Unknown Rx


 


Benzonatate [Tessalon Perles] 100 mg PO Q8HR PRN #20 capsule 19 Unknown Rx


 


Naproxen [Naprosyn] 500 mg PO BID #20 tablet 19 Unknown Rx


 


predniSONE [Deltasone] 50 mg PO QDAY #5 tab 19 Unknown Rx


 


Naproxen [Naprosyn] 500 mg PO BID #20 tablet 19 Unknown Rx


 


Ondansetron [Zofran Odt] 4 mg PO Q8HR PRN #20 tab.rapdis 19 Unknown Rx


 


traMADoL [Ultram] 50 mg PO Q6HR PRN #7 tablet 19 Unknown Rx


 


hydrOXYzine PAMOATE [Vistaril] 50 mg PO Q6HR PRN #20 capsule 20 Unknown Rx


 


Famotidine [Pepcid] 20 mg PO BID 30 Days #60 tablet 20 Unknown Rx


 


Promethazine [Phenergan] 25 mg PO Q6HR PRN #10 tab 20 Unknown Rx


 


Clindamycin [Clindamycin CAP] 600 mg PO BID 10 Days #20 capsule 20 Unknown

Rx











                                    Allergies











Allergy/AdvReac Type Severity Reaction Status Date / Time


 


paroxetine [From Paxil] Allergy  Unknown Verified 20 10:58














ED Dental HPI





- General


Chief complaint: Dental/Oral


Stated complaint: HARD TO BREATH


Time Seen by Provider: 20 14:29


Source: patient


Mode of arrival: Ambulatory


Limitations: No Limitations





- Related Data


                                  Previous Rx's











 Medication  Instructions  Recorded  Last Taken  Type


 


Albuterol Sulfate [Proventil Hfa] 2 puff IH Q4HR PRN #1 hfa.aer.ad 19 

Unknown Rx


 


Azithromycin 500 mg PO QDAY #3 tablet 19 Unknown Rx


 


Benzonatate [Tessalon Perles] 100 mg PO Q8HR PRN #20 capsule 19 Unknown Rx


 


Naproxen [Naprosyn] 500 mg PO BID #20 tablet 19 Unknown Rx


 


predniSONE [Deltasone] 50 mg PO QDAY #5 tab 19 Unknown Rx


 


Naproxen [Naprosyn] 500 mg PO BID #20 tablet 19 Unknown Rx


 


Ondansetron [Zofran Odt] 4 mg PO Q8HR PRN #20 tab.rapdis 19 Unknown Rx


 


traMADoL [Ultram] 50 mg PO Q6HR PRN #7 tablet 19 Unknown Rx


 


hydrOXYzine PAMOATE [Vistaril] 50 mg PO Q6HR PRN #20 capsule 20 Unknown Rx


 


Famotidine [Pepcid] 20 mg PO BID 30 Days #60 tablet 20 Unknown Rx


 


Promethazine [Phenergan] 25 mg PO Q6HR PRN #10 tab 20 Unknown Rx


 


Clindamycin [Clindamycin CAP] 600 mg PO BID 10 Days #20 capsule 20 Unknown

Rx











                                    Allergies











Allergy/AdvReac Type Severity Reaction Status Date / Time


 


paroxetine [From Paxil] Allergy  Unknown Verified 20 10:58














ED Review of Systems


ROS: 


Stated complaint: HARD TO BREATH


Other details as noted in HPI








ED Past Medical Hx





- Past Medical History


Previous Medical History?: Yes


Hx Psychiatric Treatment: Yes (Anxiety)


Additional medical history: RACING HEART





- Surgical History


Past Surgical History?: No





- Social History


Smoking Status: Former Smoker


Substance Use Type: None





- Medications


Home Medications: 


                                Home Medications











 Medication  Instructions  Recorded  Confirmed  Last Taken  Type


 


Albuterol Sulfate [Proventil Hfa] 2 puff IH Q4HR PRN #1 hfa.aer.ad 19  

Unknown Rx


 


Azithromycin 500 mg PO QDAY #3 tablet 19  Unknown Rx


 


Benzonatate [Tessalon Perles] 100 mg PO Q8HR PRN #20 capsule 19  Unknown 

Rx


 


Naproxen [Naprosyn] 500 mg PO BID #20 tablet 19  Unknown Rx


 


predniSONE [Deltasone] 50 mg PO QDAY #5 tab 19  Unknown Rx


 


Naproxen [Naprosyn] 500 mg PO BID #20 tablet 19  Unknown Rx


 


Ondansetron [Zofran Odt] 4 mg PO Q8HR PRN #20 tab.rapdis 19  Unknown Rx


 


traMADoL [Ultram] 50 mg PO Q6HR PRN #7 tablet 19  Unknown Rx


 


hydrOXYzine PAMOATE [Vistaril] 50 mg PO Q6HR PRN #20 capsule 20  Unknown 

Rx


 


Famotidine [Pepcid] 20 mg PO BID 30 Days #60 tablet 20  Unknown Rx


 


Promethazine [Phenergan] 25 mg PO Q6HR PRN #10 tab 20  Unknown Rx


 


Clindamycin [Clindamycin CAP] 600 mg PO BID 10 Days #20 capsule 20  

Unknown Rx














ED Physical Exam





- General


Limitations: No Limitations


General appearance: alert, in no apparent distress





- Head


Head exam: Present: atraumatic, normocephalic





- Eye


Eye exam: Present: normal appearance





- Expanded ENT Exam


  ** Expanded


Teeth exam: Present: dental caries, gingival enlargement


Throat exam: Positive: normal inspection





- Respiratory


Respiratory exam: Present: normal lung sounds bilaterally.  Absent: respiratory 

distress





- Cardiovascular


Cardiovascular Exam: Present: regular rate, normal rhythm.  Absent: systolic 

murmur, diastolic murmur, rubs, gallop





- Neurological Exam


Neurological exam: Present: alert, oriented X3, normal gait





- Psychiatric


Psychiatric exam: Present: normal affect, normal mood





- Skin


Skin exam: Present: warm, dry, intact, normal color.  Absent: rash





ED Course





                                   Vital Signs











  20





  10:55


 


Temperature 98.1 F


 


Pulse Rate 78


 


Respiratory 18





Rate 


 


Blood Pressure 112/69


 


O2 Sat by Pulse 100





Oximetry 














ED Medical Decision Making





- Medical Decision Making





35-year-old -American female presents to the emergency room for an 

abscess to the left side of her gum for the last couple nights.  Patient states 

that ibuprofen 400 mg is not helping with her pain.  Patient also reports a 

scratchy throat.  Patient denies any fever no nausea no vomiting no cough.





Patient be placed on clindamycin 300 mg p.o. twice daily for 10 days.  Patient 

can take ibuprofen 600 mg for pain management.  Patient needs to follow-up with 

a dentist as she has multiple dental work that needs to be completed


Critical care attestation.: 


If time is entered above; I have spent that time in minutes in the direct care 

of this critically ill patient, excluding procedure time.








ED Disposition


Clinical Impression: 


 Dental abscess





Disposition:  TO HOME OR SELFCARE


Is pt being admited?: No


Does the pt Need Aspirin: No


Condition: Stable


Instructions:  Dental Abscess (ED)


Additional Instructions: 


Complete antibiotics as prescribed.  Take over-the-counter ibuprofen 600 mg 

which is 3 over-the-counter ibuprofens.  Follow-up with a dentist.  Continue 

followed up with your primary care provider


Prescriptions: 


Clindamycin [Clindamycin CAP] 600 mg PO BID 10 Days #20 capsule


Referrals: 


PRIMARY CARE,MD [Primary Care Provider] - 3-5 Days


[facility] [Outside] - 3-5 Days


[facility] [Outside] - 3-5 Days


[facility] [Outside] - 3-5 Days

## 2020-09-04 ENCOUNTER — HOSPITAL ENCOUNTER (EMERGENCY)
Dept: HOSPITAL 5 - ED | Age: 35
Discharge: HOME | End: 2020-09-04
Payer: MEDICAID

## 2020-09-04 VITALS — SYSTOLIC BLOOD PRESSURE: 139 MMHG | DIASTOLIC BLOOD PRESSURE: 85 MMHG

## 2020-09-04 DIAGNOSIS — Z79.1: ICD-10-CM

## 2020-09-04 DIAGNOSIS — Z88.8: ICD-10-CM

## 2020-09-04 DIAGNOSIS — Z79.2: ICD-10-CM

## 2020-09-04 DIAGNOSIS — J06.9: ICD-10-CM

## 2020-09-04 DIAGNOSIS — J02.9: Primary | ICD-10-CM

## 2020-09-04 DIAGNOSIS — Z79.899: ICD-10-CM

## 2020-09-04 DIAGNOSIS — F41.9: ICD-10-CM

## 2020-09-04 DIAGNOSIS — Z87.891: ICD-10-CM

## 2020-09-04 DIAGNOSIS — J01.00: ICD-10-CM

## 2020-09-04 PROCEDURE — 99282 EMERGENCY DEPT VISIT SF MDM: CPT

## 2020-09-04 RX ADMIN — IBUPROFEN ONE MG: 600 TABLET, FILM COATED ORAL at 23:15

## 2020-09-04 RX ADMIN — AMOXICILLIN AND CLAVULANATE POTASSIUM ONE: 875; 125 TABLET, FILM COATED ORAL at 23:44

## 2020-09-04 RX ADMIN — AMOXICILLIN AND CLAVULANATE POTASSIUM ONE EACH: 875; 125 TABLET, FILM COATED ORAL at 23:15

## 2020-09-04 RX ADMIN — IBUPROFEN ONE: 600 TABLET, FILM COATED ORAL at 23:45

## 2020-09-04 NOTE — EMERGENCY DEPARTMENT REPORT
- General


Chief Complaint: Back Pain/Injury


Stated Complaint: POSS SINUS INFECTION


Source: patient


Mode of arrival: Ambulatory


Limitations: No Limitations





- History of Present Illness


Initial Comments: 





Patient is a 35-year-old -American female with past medical history 

presents to the ED with complaint of acute onset persistent nasal and sinus 

congestion, sore throat, sinus headache, and lack of appetite for the last 2 

days.  Patient denies dizziness, syncope, chest pain, shortness of breath, 

cough, abdominal pain, diarrhea, dysuria, urinary frequency and urgency, change 

in vision or fever and chills.  Patient states that she has not taken any 

medications over-the-counter prior to arrival in the ED.


MD Complaint: cough, sore throat, rhinorrhea, nasal congestion, sinus pain


-: days(s) (2)


Severity: moderate


Severity scale (0 -10): 5


Quality: sharp, aching


Consistency: constant


Improves With: nothing


Worsens With: nothing


Associated Symptoms: denies other symptoms, chills, myalgias, headache, 

rhinorrhea, nasal congestion, sore throat.  denies: fever, diaphoresis, stiff 

neck, cough, chest pain, shortness of breath, abdominal pain, nausea, vomiting, 

rash, right sweats, epistaxis, hoarseness, ear pain


Treatments Prior to Arrival: none





- Related Data


                                  Previous Rx's











 Medication  Instructions  Recorded  Last Taken  Type


 


Albuterol Sulfate [Proventil Hfa] 2 puff IH Q4HR PRN #1 hfa.aer.ad 11/28/19 

Unknown Rx


 


Azithromycin 500 mg PO QDAY #3 tablet 11/28/19 Unknown Rx


 


Benzonatate [Tessalon Perles] 100 mg PO Q8HR PRN #20 capsule 11/28/19 Unknown Rx


 


Naproxen [Naprosyn] 500 mg PO BID #20 tablet 11/28/19 Unknown Rx


 


predniSONE [Deltasone] 50 mg PO QDAY #5 tab 11/28/19 Unknown Rx


 


Naproxen [Naprosyn] 500 mg PO BID #20 tablet 12/28/19 Unknown Rx


 


Ondansetron [Zofran Odt] 4 mg PO Q8HR PRN #20 tab.rapdis 12/28/19 Unknown Rx


 


traMADoL [Ultram] 50 mg PO Q6HR PRN #7 tablet 12/28/19 Unknown Rx


 


hydrOXYzine PAMOATE [Vistaril] 50 mg PO Q6HR PRN #20 capsule 01/27/20 Unknown Rx


 


Famotidine [Pepcid] 20 mg PO BID 30 Days #60 tablet 02/03/20 Unknown Rx


 


Promethazine [Phenergan] 25 mg PO Q6HR PRN #10 tab 02/03/20 Unknown Rx


 


Clindamycin [Clindamycin CAP] 600 mg PO BID 10 Days #20 capsule 03/16/20 Unknown

Rx


 


Amoxicillin [Trimox CAP] 500 mg PO Q8H #30 capsule 09/04/20 Unknown Rx


 


Cetirizine HCl [Zyrtec 10mg tab] 10 mg PO DAILY #30 tablet 09/04/20 Unknown Rx


 


Ibuprofen [Motrin] 600 mg PO Q8H PRN #30 tablet 09/04/20 Unknown Rx


 


methylPREDNISolone [Medrol 4MG 4 mg PO DAILY #21 tab.ds.pk 09/04/20 Unknown Rx





DOSEPAK (21 tabs)]    











                                    Allergies











Allergy/AdvReac Type Severity Reaction Status Date / Time


 


paroxetine [From Paxil] Allergy  Unknown Verified 03/16/20 10:58














ED Review of Systems


ROS: 


Stated complaint: POSS SINUS INFECTION


Other details as noted in HPI





Constitutional: denies: chills, fever


Eyes: denies: eye pain, eye discharge, vision change


ENT: throat pain, congestion, other (Frontal sinus pressure).  denies: ear pain


Respiratory: denies: cough, shortness of breath, wheezing


Cardiovascular: denies: chest pain, palpitations


Endocrine: no symptoms reported


Gastrointestinal: denies: abdominal pain, nausea, vomiting, diarrhea


Genitourinary: denies: urgency, dysuria, discharge


Musculoskeletal: denies: back pain, joint swelling, arthralgia


Skin: denies: rash, lesions


Neurological: denies: headache, weakness, paresthesias


Psychiatric: denies: anxiety, depression


Hematological/Lymphatic: denies: easy bleeding, easy bruising





ED Past Medical Hx





- Past Medical History


Previous Medical History?: Yes


Hx Psychiatric Treatment: Yes (Anxiety)


Additional medical history: RACING HEART





- Surgical History


Past Surgical History?: No





- Social History


Smoking Status: Former Smoker


Substance Use Type: None





- Medications


Home Medications: 


                                Home Medications











 Medication  Instructions  Recorded  Confirmed  Last Taken  Type


 


Albuterol Sulfate [Proventil Hfa] 2 puff IH Q4HR PRN #1 hfa.aer.ad 11/28/19  

Unknown Rx


 


Azithromycin 500 mg PO QDAY #3 tablet 11/28/19  Unknown Rx


 


Benzonatate [Tessalon Perles] 100 mg PO Q8HR PRN #20 capsule 11/28/19  Unknown 

Rx


 


Naproxen [Naprosyn] 500 mg PO BID #20 tablet 11/28/19  Unknown Rx


 


predniSONE [Deltasone] 50 mg PO QDAY #5 tab 11/28/19  Unknown Rx


 


Naproxen [Naprosyn] 500 mg PO BID #20 tablet 12/28/19  Unknown Rx


 


Ondansetron [Zofran Odt] 4 mg PO Q8HR PRN #20 tab.rapdis 12/28/19  Unknown Rx


 


traMADoL [Ultram] 50 mg PO Q6HR PRN #7 tablet 12/28/19  Unknown Rx


 


hydrOXYzine PAMOATE [Vistaril] 50 mg PO Q6HR PRN #20 capsule 01/27/20  Unknown 

Rx


 


Famotidine [Pepcid] 20 mg PO BID 30 Days #60 tablet 02/03/20  Unknown Rx


 


Promethazine [Phenergan] 25 mg PO Q6HR PRN #10 tab 02/03/20  Unknown Rx


 


Clindamycin [Clindamycin CAP] 600 mg PO BID 10 Days #20 capsule 03/16/20  

Unknown Rx


 


Amoxicillin [Trimox CAP] 500 mg PO Q8H #30 capsule 09/04/20  Unknown Rx


 


Cetirizine HCl [Zyrtec 10mg tab] 10 mg PO DAILY #30 tablet 09/04/20  Unknown Rx


 


Ibuprofen [Motrin] 600 mg PO Q8H PRN #30 tablet 09/04/20  Unknown Rx


 


methylPREDNISolone [Medrol 4MG 4 mg PO DAILY #21 tab.ds.pk 09/04/20  Unknown Rx





DOSEPAK (21 tabs)]     














ED Physical Exam





- General


Limitations: No Limitations


General appearance: alert, in no apparent distress





- Head


Head exam: Present: atraumatic, normocephalic, normal inspection





- Eye


Eye exam: Present: normal appearance, PERRL, EOMI


Pupils: Present: normal accommodation





- ENT


ENT exam: Present: mucous membranes moist, TM's normal bilaterally, normal 

external ear exam, other (Grossly congested nasal passages; palpable frontal 

sinus tenderness and mildly erythematous oropharynx)





- Neck


Neck exam: Present: normal inspection, full ROM.  Absent: tenderness, 

lymphadenopathy





- Respiratory


Respiratory exam: Present: normal lung sounds bilaterally.  Absent: respiratory 

distress, wheezes, rales, rhonchi, chest wall tenderness, accessory muscle use, 

prolonged expiratory





- Cardiovascular


Cardiovascular Exam: Present: regular rate, normal rhythm, normal heart sounds. 

Absent: systolic murmur, diastolic murmur, rubs, gallop





- GI/Abdominal


GI/Abdominal exam: Present: soft, normal bowel sounds.  Absent: tenderness, 

guarding, rebound, rigid, hyperactive bowel sounds, hypoactive bowel sounds, 

organomegaly





- Extremities Exam


Extremities exam: Present: normal inspection, full ROM, normal capillary refill





- Back Exam


Back exam: Present: normal inspection, full ROM.  Absent: tenderness, CVA 

tenderness (R), CVA tenderness (L), muscle spasm, paraspinal tenderness, 

vertebral tenderness





- Neurological Exam


Neurological exam: Present: alert, oriented X3, CN II-XII intact, normal gait, 

reflexes normal





- Psychiatric


Psychiatric exam: Present: normal affect, normal mood





- Skin


Skin exam: Present: warm, dry, intact, normal color.  Absent: rash





ED Course


                                   Vital Signs











  09/04/20





  20:36


 


Temperature 98.3 F


 


Pulse Rate 84


 


Respiratory 18





Rate 


 


Blood Pressure 139/85


 


O2 Sat by Pulse 100





Oximetry 














ED Medical Decision Making





- Medical Decision Making





This is a 35-year-old -American female with past medical history presents

to the ED with complaint of acute onset persistent nasal and sinus congestion, 

sore throat, sinus headache, and lack of appetite for the last 2 days.  In the 

ED, patient is alert and oriented x3 and is not in distress.  Patient was 

treated in the ED for pain and treated with steroids.  Patient was discharged 

home on medications and advised to follow-up with her primary care physician in 

5 to 7 days for reevaluation return to the ED immediately if symptoms get worse.





- Differential Diagnosis


Sinusitis, Pharyngitis; URI; Rhinitis; sinus headache


Critical care attestation.: 


If time is entered above; I have spent that time in minutes in the direct care 

of this critically ill patient, excluding procedure time.








ED Disposition


Clinical Impression: 


 Acute upper respiratory infection





Acute maxillary sinusitis, unspecified


Qualifiers:


 Recurrence: non-recurrent Qualified Code(s): J01.00 - Acute maxillary 

sinusitis, unspecified





Acute pharyngitis


Qualifiers:


 Pharyngitis/tonsillitis etiology: unspecified etiology Qualified Code(s): J02.9

- Acute pharyngitis, unspecified





Disposition: DC-01 TO HOME OR SELFCARE


Is pt being admited?: No


Does the pt Need Aspirin: No


Condition: Stable


Instructions:  Pharyngitis (ED), Upper Respiratory Infection (ED), Acute 

Bacterial Rhinosinusitis (ED)


Additional Instructions: 


Take medication with food, drink plenty of fluids and follow-up with your 

primary care physician in 7 to 10 days for reevaluation.  Return to the ED 

immediately if symptoms get worse.


Prescriptions: 


methylPREDNISolone [Medrol 4MG DOSEPAK (21 tabs)] 4 mg PO DAILY #21 tab.ds.pk


Ibuprofen [Motrin] 600 mg PO Q8H PRN #30 tablet


 PRN Reason: Pain


Amoxicillin [Trimox CAP] 500 mg PO Q8H #30 capsule


Cetirizine HCl [Zyrtec 10mg tab] 10 mg PO DAILY #30 tablet


Referrals: 


IVELISSE NINA MD [Primary Care Provider] - 3-5 Days


Time of Disposition: 23:18


Print Language: ENGLISH

## 2022-02-11 ENCOUNTER — HOSPITAL ENCOUNTER (EMERGENCY)
Dept: HOSPITAL 5 - ED | Age: 37
LOS: 1 days | Discharge: LEFT BEFORE DISCHARGE | End: 2022-02-12
Payer: MEDICAID

## 2022-02-11 DIAGNOSIS — Z53.21: ICD-10-CM

## 2022-02-11 DIAGNOSIS — R10.9: Primary | ICD-10-CM
